# Patient Record
Sex: FEMALE | Race: ASIAN | Employment: UNEMPLOYED | URBAN - METROPOLITAN AREA
[De-identification: names, ages, dates, MRNs, and addresses within clinical notes are randomized per-mention and may not be internally consistent; named-entity substitution may affect disease eponyms.]

---

## 2019-06-14 ENCOUNTER — COMMUNICATION - HEALTHEAST (OUTPATIENT)
Dept: GERIATRIC MEDICINE | Facility: CLINIC | Age: 69
End: 2019-06-14

## 2019-06-25 ENCOUNTER — COMMUNICATION - HEALTHEAST (OUTPATIENT)
Dept: GERIATRIC MEDICINE | Facility: CLINIC | Age: 69
End: 2019-06-25

## 2021-05-29 NOTE — PROGRESS NOTES
[facility] Care Coordination Contact    Member became effective with  Cathleen on 6/1/19 with Fariha MSC+.  Previous Health Plan: [place]   Previous Care System: [place] Transfer  Previous care coordinators name and number: No record found in MMIS.  Waiver Type: N/A  Last MMIS Entry: Date None and Type n/a  MMIS visit date if different from above: None  UTF received: No: Unable to located what agency managed this member.     Jerry Buitrago  Care Management Specialist  [facility]  [phone]

## 2021-05-30 NOTE — PROGRESS NOTES
[facility] Care Coordination Contact  [facility] Initial Assessment     Home visit for Initial Health Risk Assessment with Chiquita Oneil completed on  6/25/2019    Type of residence:: Apartment - handicap accessible  Current living arrangement:: I live in a private home with family     Assessment completed with:: Patient, Care Team Member, Family, Other(sister, El Oneil)    Current Care Plan  Member currently receiving the following home care services:     Member currently receiving the following community resources: Other (see comment)(Care Coordination)    Medication Review  Medication reconciliation completed in Epic: IF NO, PLEASE EXPLAIN Member has not established cares with PCP in [place] yet.  Member does not know what medications she took when she lived in [place].  Medication set-up & administration: Independent-does not set up  n/a  Medication Risk Assessment Medication (1 or more, place referral to MTM):  N/A: No risk factors identified  MTM Referral Placed: No: member will establish cares with a PCP in [place] first.  Member will see a PCP at [facility].    Mental/Behavioral Health   Depression Screening: See PHQ assessment flowsheet.   Mental health DX:: No      Mental Health Diagnosis: No  Mental Health Services: None: No further intervention needed at this time.    Falls Assessment:   Fallen 2 or more times in the past year?: No   Any fall with injury in the past year?: No    ADL/IADL Dependencies:   Dependent ADLs:: Independent  Dependent IADLs:: Cooking, Cleaning, Laundry, Shopping, Medication Management, Money Management, Transportation    [facility] sponsored benefits: Shared information re: Silver Sneakers/gym memberships, ASA, Calcium +D.    PCA Assessment completed at visit: No    Elderly Waiver Eligibility: Yes - will open to EW    Care Plan & Recommendations: Per POC.    See LTCC for detailed assessment information.    Follow-Up Plan: Member informed of future contact, plan  to f/u with member with a 6 month telephone assessment.  Contact information shared with member and family, encouraged member to call with any questions or concerns at any time.    [place] care continuum providers: Please refer to Health Care Home on the Epic Problem List to view this patient's [facility] Care Plan Summary.    Yola Varela RN  [facility]  [phone]       7-16-19:  Writer received call from [facility] stating that member's U-code has been removed.  Writer opened member up to EW in MMIS successfully.  Writer completed 5181, 6037, and Disenrollment Checklist due to member establishing cares with [facility].  Writer will transfer member effective 8-1-19.      Yola Varela RN  [facility]  [phone]

## 2021-05-30 NOTE — PROGRESS NOTES
[facility] Care Coordination Contact    Received after visit chart from care coordinator.  Completed following tasks:    Mailed copy of care plan to client, Updated services in access and Submitted referrals/auths for ADC and ADC transportation with [facility].     Provider Signature - No POC Shared:  Member indicates that they do not want their POC shared with any EW providers.     Member establish care at [facility], Dr. Al Smith at [facility] in [facility]. Today member's niece took member to measure her set of denture at this clinic and no need to find or set up another dentist appointment p/member's niece.     Jerry Buitrago  Care Management Specialist  [facility]  [phone]

## 2021-05-30 NOTE — PROGRESS NOTES
No longer active with [facility] community case management effective 8/1/19.    Reason for community disenrollment: Change Care System/PCC to [facility] in [place], [place].      Jerry Buitrago  Care Management Specialist  [facility]  [phone]

## 2021-06-19 NOTE — LETTER
Letter by Yola Varela RN at      Author: Yola Varela RN Service: -- Author Type: --    Filed:  Encounter Date: 6/14/2019 Status: (Other)         June 14, 2019    CHIQUITA CABA  [address]  [place] [address]        Dear  Chiquita    Welcome to [facility] Senior Care Plus (MSC+) health program. My name is Yola Varela RN. I am your MSC+ care coordinator.     I will call you soon to see how you are doing and determine what needs you may have. My job is to help connect you to services, complete an assessment, and develop a care plan with you. There is no charge to you for the care coordination and assessment services. Our goal is to keep you as healthy and independent as possible.     [facility] includes the benefits you may receive from Medical Assistance.    Soon, you will receive a new MSC+ member identification (ID) card from [facility]. When you receive it, please use this card along with your [place] Health Care Programs card and Prescription Drug Coverage Program card. When you receive, it please use this card where you get your health services. If you have Medicare, you will need to show your Medicare card when you get health services.    If you have questions, please call me at [phone]. If you reach my voice mail, leave a message and your phone number. If you are hearing impaired, please call the [place] Relay at 872 or [phone] (eludlj-vp-[email or web address] relay service).    Sincerely,        Yola Varela RN    E-mail: [email or web address]  Phone: [phone]    Care Manager  [facility]+ [id number] DHS Approved ([id number])  [id number] (11/18)

## 2021-06-19 NOTE — LETTER
Letter by Yola Varela RN at      Author: Yola Varela RN Service: -- Author Type: --    Filed:  Encounter Date: 6/14/2019 Status: (Other)       July 18, 2019    CHIQUITA ROSALES  [address]  [facility] [address]        Dear Chiquita:    At [facility], we are dedicated to improving your health and well-being. Enclosed is the Comprehensive Care Plan that we developed with you on 6/25/19. Please review the Care Plan carefully.    As a reminder, some of the things we discussed at your visit include:    Your physical and mental health    Ways to reduce falls    Health care needs you may have    Dont forget to contact your care coordinator if you:    Have been hospitalized or plan to be hospitalized     Have had a fall     Have experienced a change in physical health    Are experiencing emotional problems     If you do not agree with your Care Plan, have questions about it, or have experienced a change in your needs, please call me at [phone]. If you are hearing impaired, please call the [place] Relay at 641 or [phone] (ursncg-[email or web address] relay service).    Sincerely,          Yola Varela RN    E-mail: [email or web address]  Phone: [phone]    Care Manager  [facility]+[id number] [place] [id number]     [id number] (11/18)

## 2022-04-05 ENCOUNTER — HOSPITAL ENCOUNTER (EMERGENCY)
Facility: HOSPITAL | Age: 72
Discharge: HOME | End: 2022-04-05
Attending: EMERGENCY MEDICINE | Admitting: EMERGENCY MEDICINE
Payer: MEDICARE

## 2022-04-05 ENCOUNTER — APPOINTMENT (OUTPATIENT)
Dept: CT IMAGING | Facility: HOSPITAL | Age: 72
End: 2022-04-05
Attending: EMERGENCY MEDICINE
Payer: MEDICARE

## 2022-04-05 VITALS
BODY MASS INDEX: 26.61 KG/M2 | HEIGHT: 59 IN | WEIGHT: 132 LBS | OXYGEN SATURATION: 96 % | RESPIRATION RATE: 15 BRPM | DIASTOLIC BLOOD PRESSURE: 78 MMHG | SYSTOLIC BLOOD PRESSURE: 117 MMHG | TEMPERATURE: 99 F | HEART RATE: 65 BPM

## 2022-04-05 DIAGNOSIS — E11.65 HYPERGLYCEMIA DUE TO DIABETES MELLITUS (H): ICD-10-CM

## 2022-04-05 LAB
ALBUMIN SERPL-MCNC: 4.5 G/DL (ref 3.5–5)
ALBUMIN UR-MCNC: NEGATIVE MG/DL
ALP SERPL-CCNC: 111 U/L (ref 45–120)
ALT SERPL W P-5'-P-CCNC: 28 U/L (ref 0–45)
ANION GAP SERPL CALCULATED.3IONS-SCNC: 14 MMOL/L (ref 5–18)
APPEARANCE UR: CLEAR
APTT PPP: 23 SECONDS (ref 22–38)
AST SERPL W P-5'-P-CCNC: 17 U/L (ref 0–40)
BASE EXCESS BLDV CALC-SCNC: 1.3 MMOL/L
BASOPHILS # BLD AUTO: 0.1 10E3/UL (ref 0–0.2)
BASOPHILS NFR BLD AUTO: 1 %
BILIRUB DIRECT SERPL-MCNC: 0.2 MG/DL
BILIRUB SERPL-MCNC: 0.5 MG/DL (ref 0–1)
BILIRUB UR QL STRIP: NEGATIVE
BUN SERPL-MCNC: 20 MG/DL (ref 8–28)
CALCIUM SERPL-MCNC: 9.8 MG/DL (ref 8.5–10.5)
CHLORIDE BLD-SCNC: 96 MMOL/L (ref 98–107)
CO2 SERPL-SCNC: 25 MMOL/L (ref 22–31)
COLOR UR AUTO: COLORLESS
CREAT SERPL-MCNC: 1.16 MG/DL (ref 0.6–1.1)
EOSINOPHIL # BLD AUTO: 0.1 10E3/UL (ref 0–0.7)
EOSINOPHIL NFR BLD AUTO: 2 %
ERYTHROCYTE [DISTWIDTH] IN BLOOD BY AUTOMATED COUNT: 11.5 % (ref 10–15)
GFR SERPL CREATININE-BSD FRML MDRD: 50 ML/MIN/1.73M2
GLUCOSE BLD-MCNC: 451 MG/DL (ref 70–125)
GLUCOSE BLDC GLUCOMTR-MCNC: 444 MG/DL (ref 70–99)
GLUCOSE UR STRIP-MCNC: >1000 MG/DL
HCO3 BLDV-SCNC: 24 MMOL/L (ref 24–30)
HCT VFR BLD AUTO: 42.9 % (ref 35–47)
HGB BLD-MCNC: 15 G/DL (ref 11.7–15.7)
HGB UR QL STRIP: NEGATIVE
IMM GRANULOCYTES # BLD: 0 10E3/UL
IMM GRANULOCYTES NFR BLD: 0 %
INR PPP: 0.99 (ref 0.9–1.15)
KETONES BLD-SCNC: 0.13 MMOL/L
KETONES UR STRIP-MCNC: NEGATIVE MG/DL
LEUKOCYTE ESTERASE UR QL STRIP: NEGATIVE
LIPASE SERPL-CCNC: 34 U/L (ref 0–52)
LYMPHOCYTES # BLD AUTO: 2 10E3/UL (ref 0.8–5.3)
LYMPHOCYTES NFR BLD AUTO: 30 %
MCH RBC QN AUTO: 29.2 PG (ref 26.5–33)
MCHC RBC AUTO-ENTMCNC: 35 G/DL (ref 31.5–36.5)
MCV RBC AUTO: 84 FL (ref 78–100)
MONOCYTES # BLD AUTO: 0.4 10E3/UL (ref 0–1.3)
MONOCYTES NFR BLD AUTO: 5 %
NEUTROPHILS # BLD AUTO: 4.3 10E3/UL (ref 1.6–8.3)
NEUTROPHILS NFR BLD AUTO: 62 %
NITRATE UR QL: NEGATIVE
NRBC # BLD AUTO: 0 10E3/UL
NRBC BLD AUTO-RTO: 0 /100
OXYHGB MFR BLDV: 68.2 % (ref 70–75)
PCO2 BLDV: 51 MM HG (ref 35–50)
PH BLDV: 7.34 [PH] (ref 7.35–7.45)
PH UR STRIP: 5.5 [PH] (ref 5–7)
PLATELET # BLD AUTO: 203 10E3/UL (ref 150–450)
PO2 BLDV: 38 MM HG (ref 25–47)
POTASSIUM BLD-SCNC: 4 MMOL/L (ref 3.5–5)
PROT SERPL-MCNC: 8.7 G/DL (ref 6–8)
RBC # BLD AUTO: 5.13 10E6/UL (ref 3.8–5.2)
RBC URINE: 0 /HPF
SAO2 % BLDV: 69.3 % (ref 70–75)
SODIUM SERPL-SCNC: 135 MMOL/L (ref 136–145)
SP GR UR STRIP: 1.03 (ref 1–1.03)
SQUAMOUS EPITHELIAL: <1 /HPF
TROPONIN I SERPL-MCNC: <0.01 NG/ML (ref 0–0.29)
UROBILINOGEN UR STRIP-MCNC: <2 MG/DL
WBC # BLD AUTO: 6.9 10E3/UL (ref 4–11)
WBC URINE: <1 /HPF

## 2022-04-05 PROCEDURE — 250N000011 HC RX IP 250 OP 636: Performed by: EMERGENCY MEDICINE

## 2022-04-05 PROCEDURE — 96361 HYDRATE IV INFUSION ADD-ON: CPT

## 2022-04-05 PROCEDURE — 258N000003 HC RX IP 258 OP 636: Performed by: EMERGENCY MEDICINE

## 2022-04-05 PROCEDURE — 81001 URINALYSIS AUTO W/SCOPE: CPT | Performed by: EMERGENCY MEDICINE

## 2022-04-05 PROCEDURE — 96360 HYDRATION IV INFUSION INIT: CPT | Mod: 59

## 2022-04-05 PROCEDURE — 85610 PROTHROMBIN TIME: CPT | Performed by: EMERGENCY MEDICINE

## 2022-04-05 PROCEDURE — 36415 COLL VENOUS BLD VENIPUNCTURE: CPT | Performed by: EMERGENCY MEDICINE

## 2022-04-05 PROCEDURE — 82805 BLOOD GASES W/O2 SATURATION: CPT | Performed by: EMERGENCY MEDICINE

## 2022-04-05 PROCEDURE — 93005 ELECTROCARDIOGRAM TRACING: CPT | Performed by: EMERGENCY MEDICINE

## 2022-04-05 PROCEDURE — 82010 KETONE BODYS QUAN: CPT | Performed by: EMERGENCY MEDICINE

## 2022-04-05 PROCEDURE — 85025 COMPLETE CBC W/AUTO DIFF WBC: CPT | Performed by: EMERGENCY MEDICINE

## 2022-04-05 PROCEDURE — 80053 COMPREHEN METABOLIC PANEL: CPT | Performed by: EMERGENCY MEDICINE

## 2022-04-05 PROCEDURE — 82248 BILIRUBIN DIRECT: CPT | Performed by: EMERGENCY MEDICINE

## 2022-04-05 PROCEDURE — 83690 ASSAY OF LIPASE: CPT | Performed by: EMERGENCY MEDICINE

## 2022-04-05 PROCEDURE — 84484 ASSAY OF TROPONIN QUANT: CPT | Performed by: EMERGENCY MEDICINE

## 2022-04-05 PROCEDURE — 70496 CT ANGIOGRAPHY HEAD: CPT

## 2022-04-05 PROCEDURE — 85730 THROMBOPLASTIN TIME PARTIAL: CPT | Performed by: EMERGENCY MEDICINE

## 2022-04-05 PROCEDURE — 99285 EMERGENCY DEPT VISIT HI MDM: CPT | Mod: 25

## 2022-04-05 RX ORDER — SODIUM CHLORIDE, SODIUM LACTATE, POTASSIUM CHLORIDE, CALCIUM CHLORIDE 600; 310; 30; 20 MG/100ML; MG/100ML; MG/100ML; MG/100ML
INJECTION, SOLUTION INTRAVENOUS ONCE
Status: COMPLETED | OUTPATIENT
Start: 2022-04-05 | End: 2022-04-05

## 2022-04-05 RX ORDER — IOPAMIDOL 755 MG/ML
75 INJECTION, SOLUTION INTRAVASCULAR ONCE
Status: COMPLETED | OUTPATIENT
Start: 2022-04-05 | End: 2022-04-05

## 2022-04-05 RX ADMIN — IOPAMIDOL 75 ML: 755 INJECTION, SOLUTION INTRAVENOUS at 21:15

## 2022-04-05 RX ADMIN — SODIUM CHLORIDE, POTASSIUM CHLORIDE, SODIUM LACTATE AND CALCIUM CHLORIDE: 600; 310; 30; 20 INJECTION, SOLUTION INTRAVENOUS at 20:01

## 2022-04-05 RX ADMIN — SODIUM CHLORIDE 1000 ML: 9 INJECTION, SOLUTION INTRAVENOUS at 17:42

## 2022-04-05 ASSESSMENT — ENCOUNTER SYMPTOMS
FEVER: 0
SORE THROAT: 0
NAUSEA: 0
SHORTNESS OF BREATH: 0
HEMATURIA: 0
DIZZINESS: 0
VOMITING: 0
CONFUSION: 0
ABDOMINAL PAIN: 0
JOINT SWELLING: 0
DIARRHEA: 0
DYSURIA: 0
CHILLS: 0

## 2022-04-05 NOTE — ED NOTES
Per registration staff-Pt's family called ED and reported pt is still here. Pt found to be in waiting room still. Family was sent home by staff reports pt very hard of hearing. Pt also does not speak english    Pt was called loudly multiple times by multiple staff in waiting room and no response.

## 2022-04-05 NOTE — ED NOTES
Writer having registration staff Attempt to find family's phone number who called earlier and writer reattempting w/ vocera in down time to communicate with pt.

## 2022-04-05 NOTE — ED TRIAGE NOTES
Pt Blood sugar HIGH: 444    Writer attempted using [place] language line- unable to understand pt at all.

## 2022-04-05 NOTE — ED PROVIDER NOTES
"  Emergency Department Encounter     Evaluation Date & Time:   4/5/2022  5:49 PM    CHIEF COMPLAINT:  Hyperglycemia and questionable neurologic problem      Triage Note:Provider in triage performing neuro eval due to concern of pt's orientation and comprehension, and speech.     Pt arrives ambulatory via triage using language line for interpretation of hmong language.  having great difficulty understanding pt and questioning if pt alert or not as incorrect answers to questions asked. Pt endorses no pain, \"thought maybe some blood work\".  explains \"reminds me when people are having a stroke the speech she is having I can not understand\"    Pt Blood sugar HIGH: 444    Writer attempted using [place] language line- unable to understand pt at all.         ED COURSE & MEDICAL DECISION MAKING:     ED Course as of 04/05/22 2155 Tue Apr 05, 2022   1843 Glucose 451, no DKA.  IVF started.  Rest of labs unremarkable.  Ketones negative.     2029 Still have not heard from pt's family, awaiting them to call back or arrive as we have no contact information for them.     2148 CTA head/neck negative.     Pt is Hmong speaking, hard of hearing and does not have any family with her to assist.  It's not entirely clear why she's here and attempts to use an  have not been helpful as pt is hard of hearing and they cannot make sense of much of what she's saying.  It's uncertain if her speech/mental status is baseline or not.  Pt is moving all extremities well and follows my commands when I instruct her at bedside. She doesn't have an obvious focal neuro deficit, but interpreters unable to make sense of her speech.  Pt does not come here, so no information in chart, including no family member information so that I can speak with them.  No stroke code called now or from triage given uncertainty of onset or if this is even chronic in nature.  Unfortunately, I have no ability to get in contact with " "family to discuss further.  Proceeding with labs, CTA head/neck.    6:01 PM I met the patient and performed my initial interview and exam.    8:04 PM I rechecked and updated the patient.    9:33 PM I spoke with patients friend, Paulina , who actually brought pt to the ED.  She confirms that pt's speech is entirely her baseline and she was brought in only for elevated glucose in the 500s at home.  They called primary clinic who told them to bring her to ED.  No other acute concerns.  Pt is on meds for DM, but does not know if she's taking them.  Nevertheless, no acute complications such as DKA and appropriate for discharge.  Discussed with friend/family close outpatient follow up with clinic for ongoing management.  CTA was done prior to me speaking with family. As a result, study was completed.  ROS obtained after speaking with friend/family.    At the conclusion of the encounter I discussed the results of all the tests and the disposition. The questions were answered. The patient or family acknowledged understanding and was agreeable with the care plan.      MEDICATIONS GIVEN IN THE EMERGENCY DEPARTMENT:  Medications   0.9% sodium chloride BOLUS (0 mLs Intravenous Stopped 4/5/22 1840)   lactated ringers infusion ( Intravenous New Bag 4/5/22 2001)   iopamidol (ISOVUE-370) solution 75 mL (75 mLs Intravenous Given 4/5/22 2115)       NEW PRESCRIPTIONS STARTED AT TODAY'S ED VISIT:  New Prescriptions    No medications on file       HPI   History limited due to language barrier. Friend provided information. Interpretor was used. Language-Billy Oneil is a 72 year old female with no pertinent history who presents to this ED by car for evaluation of altered mental status.     Unable to obtain any information from the patient. Interpretor states that patient is not making any sense and sounds like\"baby talk\".     Per friend: Friend brought patient in because her sugars were in the 500s and called the clinic who said " "to go to the ED. Notes her speech is baseline currently and nothing is new. No other acute changes or concerns. Is a known diabetic on metformin but is unsure of patients compliance with this.     REVIEW OF SYSTEMS:  Review of Systems   Constitutional: Negative for chills and fever.        Positive for high sugars   HENT: Negative for sore throat.    Eyes: Negative for visual disturbance.   Respiratory: Negative for shortness of breath.    Cardiovascular: Negative for chest pain.   Gastrointestinal: Negative for abdominal pain, diarrhea, nausea and vomiting.   Endocrine: Negative for polyuria.   Genitourinary: Negative for dysuria and hematuria.        - urinary changes     Musculoskeletal: Negative for joint swelling.   Skin: Negative for rash.   Neurological: Negative for dizziness.   Psychiatric/Behavioral: Negative for confusion.   All other systems reviewed and are negative.      Medical History   History reviewed. No pertinent past medical history.    History reviewed. No pertinent surgical history.    History reviewed. No pertinent family history.    Social History     Tobacco Use     Smoking status: None     Smokeless tobacco: None   Substance Use Topics     Alcohol use: None     Drug use: None       No current outpatient medications on file.      Physical Exam     Vitals:  /78   Pulse 65   Temp 99  F (37.2  C) (Oral)   Resp 15   Ht 1.499 m (4' 11\")   Wt 59.9 kg (132 lb)   SpO2 96%   BMI 26.66 kg/m      PHYSICAL EXAM:   Physical Exam  Vitals and nursing note reviewed.   Constitutional:       General: She is not in acute distress.     Appearance: Normal appearance.      Comments: Fully edentulous. Is hard of hearing. Follows commands.    HENT:      Head: Normocephalic and atraumatic.      Nose: Nose normal.      Mouth/Throat:      Mouth: Mucous membranes are moist.   Eyes:      Pupils: Pupils are equal, round, and reactive to light.   Cardiovascular:      Rate and Rhythm: Normal rate and regular " rhythm.      Pulses: Normal pulses.           Radial pulses are 2+ on the right side and 2+ on the left side.        Dorsalis pedis pulses are 2+ on the right side and 2+ on the left side.   Pulmonary:      Effort: Pulmonary effort is normal. No respiratory distress.      Breath sounds: Normal breath sounds.   Abdominal:      Palpations: Abdomen is soft.      Tenderness: There is no abdominal tenderness.   Musculoskeletal:      Cervical back: Full passive range of motion without pain and neck supple.      Comments: No calf tenderness or swelling b/l   Skin:     General: Skin is warm.      Findings: No rash.   Neurological:      General: No focal deficit present.      Mental Status: She is alert. Mental status is at baseline.      Comments: Fluent speech, no acute lateralizing deficits. Has full strength bilateral of upper and lower extremities. Exam limited due to language barrier.    Psychiatric:         Mood and Affect: Mood normal.         Behavior: Behavior normal.         Results     LAB:  All pertinent labs reviewed and interpreted  Labs Ordered and Resulted from Time of ED Arrival to Time of ED Departure   GLUCOSE BY METER - Abnormal       Result Value    GLUCOSE BY METER POCT 444 (*)    BASIC METABOLIC PANEL - Abnormal    Sodium 135 (*)     Potassium 4.0      Chloride 96 (*)     Carbon Dioxide (CO2) 25      Anion Gap 14      Urea Nitrogen 20      Creatinine 1.16 (*)     Calcium 9.8      Glucose 451 (*)     GFR Estimate 50 (*)    HEPATIC FUNCTION PANEL - Abnormal    Bilirubin Total 0.5      Bilirubin Direct 0.2      Protein Total 8.7 (*)     Albumin 4.5      Alkaline Phosphatase 111      AST 17      ALT 28     ROUTINE UA WITH MICROSCOPIC REFLEX TO CULTURE - Abnormal    Color Urine Colorless      Appearance Urine Clear      Glucose Urine >1000 (*)     Bilirubin Urine Negative      Ketones Urine Negative      Specific Gravity Urine 1.029      Blood Urine Negative      pH Urine 5.5      Protein Albumin Urine  Negative      Urobilinogen Urine <2.0      Nitrite Urine Negative      Leukocyte Esterase Urine Negative      RBC Urine 0      WBC Urine <1      Squamous Epithelials Urine <1     BLOOD GAS VENOUS - Abnormal    pH Venous 7.34 (*)     pCO2 Venous 51 (*)     pO2 Venous 38      Bicarbonate Venous 24      Base Excess/Deficit (+/-) 1.3      Oxyhemoglobin Venous 68.2 (*)     O2 Sat, Venous 69.3 (*)    TROPONIN I - Normal    Troponin I <0.01     LIPASE - Normal    Lipase 34     KETONE BETA-HYDROXYBUTYRATE QUANTITATIVE, RAPID - Normal    Ketone (Beta-Hydroxybutyrate) Quantitative 0.13     INR - Normal    INR 0.99     PARTIAL THROMBOPLASTIN TIME - Normal    aPTT 23     GLUCOSE MONITOR NURSING POCT   CBC WITH PLATELETS AND DIFFERENTIAL    WBC Count 6.9      RBC Count 5.13      Hemoglobin 15.0      Hematocrit 42.9      MCV 84      MCH 29.2      MCHC 35.0      RDW 11.5      Platelet Count 203      % Neutrophils 62      % Lymphocytes 30      % Monocytes 5      % Eosinophils 2      % Basophils 1      % Immature Granulocytes 0      NRBCs per 100 WBC 0      Absolute Neutrophils 4.3      Absolute Lymphocytes 2.0      Absolute Monocytes 0.4      Absolute Eosinophils 0.1      Absolute Basophils 0.1      Absolute Immature Granulocytes 0.0      Absolute NRBCs 0.0         RADIOLOGY:  CTA Head Neck with Contrast   Final Result   IMPRESSION:    HEAD CT:   1.  No CT finding of a mass, hemorrhage or focal area suggestive of acute infarct.   2.  Prominent sinus disease with possible nasal polyps present.      HEAD CTA:    1.  No discrete vessel occlusion, significant stenosis, aneurysm or high flow vascular malformation involving the arteries of the [place].      NECK CTA:   1.  Normal configuration of the great vessels off the aortic arch with no significant stenosis of their origins.   2.  No significant stenosis or irregularity involving the arteries of neck by NASCET criteria.   3.  No radiographic evidence of dissection.                    ECG:  NSR, rate 68, normal intervals, no acute ischemia    I have independently reviewed and interpreted the EKG(s) documented above     PROCEDURES:  Procedures:  none      FINAL IMPRESSION:    ICD-10-CM    1. Hyperglycemia due to diabetes mellitus (H)  E11.65        0 minutes of critical care time      I, Max Michele, am serving as a scribe to document services personally performed by Dr. Laron Youngblood, based on my observations and the provider's statements to me. I, Laron Youngblood, DO attest that Max Ryne is acting in a scribe capacity, has observed my performance of the services and has documented them in accordance with my direction.      Laron Youngblood DO  Emergency Medicine  [facility] EMERGENCY DEPARTMENT  4/5/2022  6:11 PM        Laron Youngblood MD  04/05/22 2581

## 2022-04-05 NOTE — ED TRIAGE NOTES
"Pt arrives ambulatory via triage using language line for interpretation of hmong language.  having great difficulty understanding pt and questioning if pt alert or not as incorrect answers to questions asked. Pt endorses no pain, \"thought maybe some blood work\".  explains \"reminds me when people are having a stroke the speech she is having I can not understand\"  "

## 2022-04-05 NOTE — ED NOTES
No stroke code. Provider having difficulty obtaining neuro eval, unable to get reliable info, pt having difficulty understanding commands. Pt having difficulty hearing in addition. Unclear if hearing related or neurological relation

## 2022-04-05 NOTE — ED TRIAGE NOTES
Provider in triage performing neuro eval due to concern of pt's orientation and comprehension, and speech.

## 2022-04-06 LAB
ATRIAL RATE - MUSE: 68 BPM
DIASTOLIC BLOOD PRESSURE - MUSE: NORMAL MMHG
INTERPRETATION ECG - MUSE: NORMAL
P AXIS - MUSE: 39 DEGREES
PR INTERVAL - MUSE: 160 MS
QRS DURATION - MUSE: 92 MS
QT - MUSE: 406 MS
QTC - MUSE: 431 MS
R AXIS - MUSE: 2 DEGREES
SYSTOLIC BLOOD PRESSURE - MUSE: NORMAL MMHG
T AXIS - MUSE: 66 DEGREES
VENTRICULAR RATE- MUSE: 68 BPM

## 2022-04-06 NOTE — DISCHARGE INSTRUCTIONS
Take your diabetes medications as directed and follow up this week with primary clinic to discuss further.  Your glucose was elevated, but no acute changes or complications found with kidneys and no signs of diabetic ketoacidosis.  You are safe to be at home, but should follow up closely with clinic.